# Patient Record
Sex: FEMALE | Race: WHITE | NOT HISPANIC OR LATINO | Employment: FULL TIME | URBAN - METROPOLITAN AREA
[De-identification: names, ages, dates, MRNs, and addresses within clinical notes are randomized per-mention and may not be internally consistent; named-entity substitution may affect disease eponyms.]

---

## 2024-05-19 ENCOUNTER — HOSPITAL ENCOUNTER (EMERGENCY)
Facility: CLINIC | Age: 24
Discharge: HOME | End: 2024-05-20
Attending: PHYSICIAN ASSISTANT | Admitting: PHYSICIAN ASSISTANT
Payer: COMMERCIAL

## 2024-05-19 DIAGNOSIS — R07.9 CHEST PAIN: ICD-10-CM

## 2024-05-19 DIAGNOSIS — J45.909 ASTHMA: ICD-10-CM

## 2024-05-19 DIAGNOSIS — R30.0 DYSURIA: ICD-10-CM

## 2024-05-19 DIAGNOSIS — R06.00 DYSPNEA: ICD-10-CM

## 2024-05-19 DIAGNOSIS — J04.0 LARYNGITIS: ICD-10-CM

## 2024-05-19 DIAGNOSIS — R05.9 COUGH: ICD-10-CM

## 2024-05-19 DIAGNOSIS — J02.9 PHARYNGITIS: ICD-10-CM

## 2024-05-19 LAB
ALBUMIN UR-MCNC: NEGATIVE MG/DL
ANION GAP SERPL CALCULATED.3IONS-SCNC: 9 MMOL/L (ref 7–15)
APPEARANCE UR: CLEAR
BASOPHILS # BLD AUTO: 0 10E3/UL (ref 0–0.2)
BASOPHILS NFR BLD AUTO: 0 %
BILIRUB UR QL STRIP: NEGATIVE
BUN SERPL-MCNC: 13.3 MG/DL (ref 6–20)
CALCIUM SERPL-MCNC: 9 MG/DL (ref 8.6–10)
CHLORIDE SERPL-SCNC: 103 MMOL/L (ref 98–107)
COLOR UR AUTO: ABNORMAL
CREAT SERPL-MCNC: 0.59 MG/DL (ref 0.51–0.95)
D DIMER PPP FEU-MCNC: 0.55 UG/ML FEU (ref 0–0.5)
DEPRECATED HCO3 PLAS-SCNC: 25 MMOL/L (ref 22–29)
EGFRCR SERPLBLD CKD-EPI 2021: >90 ML/MIN/1.73M2
EOSINOPHIL # BLD AUTO: 0.3 10E3/UL (ref 0–0.7)
EOSINOPHIL NFR BLD AUTO: 5 %
ERYTHROCYTE [DISTWIDTH] IN BLOOD BY AUTOMATED COUNT: 13.8 % (ref 10–15)
FLUAV RNA SPEC QL NAA+PROBE: NEGATIVE
FLUBV RNA RESP QL NAA+PROBE: NEGATIVE
GLUCOSE SERPL-MCNC: 90 MG/DL (ref 70–99)
GLUCOSE UR STRIP-MCNC: NEGATIVE MG/DL
GROUP A STREP BY PCR: NOT DETECTED
HCG UR QL: NEGATIVE
HCT VFR BLD AUTO: 40.7 % (ref 35–47)
HGB BLD-MCNC: 12.5 G/DL (ref 11.7–15.7)
HGB UR QL STRIP: NEGATIVE
HOLD SPECIMEN: NORMAL
IMM GRANULOCYTES # BLD: 0 10E3/UL
IMM GRANULOCYTES NFR BLD: 0 %
KETONES UR STRIP-MCNC: ABNORMAL MG/DL
LEUKOCYTE ESTERASE UR QL STRIP: NEGATIVE
LYMPHOCYTES # BLD AUTO: 1.9 10E3/UL (ref 0.8–5.3)
LYMPHOCYTES NFR BLD AUTO: 33 %
MCH RBC QN AUTO: 24.4 PG (ref 26.5–33)
MCHC RBC AUTO-ENTMCNC: 30.7 G/DL (ref 31.5–36.5)
MCV RBC AUTO: 79 FL (ref 78–100)
MONOCYTES # BLD AUTO: 0.5 10E3/UL (ref 0–1.3)
MONOCYTES NFR BLD AUTO: 8 %
MUCOUS THREADS #/AREA URNS LPF: PRESENT /LPF
NEUTROPHILS # BLD AUTO: 3.1 10E3/UL (ref 1.6–8.3)
NEUTROPHILS NFR BLD AUTO: 54 %
NITRATE UR QL: NEGATIVE
NRBC # BLD AUTO: 0 10E3/UL
NRBC BLD AUTO-RTO: 0 /100
PH UR STRIP: 5 [PH] (ref 5–7)
PLATELET # BLD AUTO: 241 10E3/UL (ref 150–450)
POTASSIUM SERPL-SCNC: 3.9 MMOL/L (ref 3.4–5.3)
RBC # BLD AUTO: 5.13 10E6/UL (ref 3.8–5.2)
RBC URINE: 2 /HPF
RSV RNA SPEC NAA+PROBE: NEGATIVE
SARS-COV-2 RNA RESP QL NAA+PROBE: NEGATIVE
SODIUM SERPL-SCNC: 137 MMOL/L (ref 135–145)
SP GR UR STRIP: 1.03 (ref 1–1.03)
SQUAMOUS EPITHELIAL: 4 /HPF
TROPONIN T SERPL HS-MCNC: <6 NG/L
UROBILINOGEN UR STRIP-MCNC: NORMAL MG/DL
WBC # BLD AUTO: 5.8 10E3/UL (ref 4–11)
WBC URINE: 1 /HPF

## 2024-05-19 PROCEDURE — 81025 URINE PREGNANCY TEST: CPT | Performed by: PHYSICIAN ASSISTANT

## 2024-05-19 PROCEDURE — 36415 COLL VENOUS BLD VENIPUNCTURE: CPT | Performed by: PHYSICIAN ASSISTANT

## 2024-05-19 PROCEDURE — 87637 SARSCOV2&INF A&B&RSV AMP PRB: CPT | Performed by: PHYSICIAN ASSISTANT

## 2024-05-19 PROCEDURE — 99285 EMERGENCY DEPT VISIT HI MDM: CPT | Mod: 25

## 2024-05-19 PROCEDURE — 87651 STREP A DNA AMP PROBE: CPT | Performed by: PHYSICIAN ASSISTANT

## 2024-05-19 PROCEDURE — 80048 BASIC METABOLIC PNL TOTAL CA: CPT | Performed by: PHYSICIAN ASSISTANT

## 2024-05-19 PROCEDURE — 85004 AUTOMATED DIFF WBC COUNT: CPT | Performed by: PHYSICIAN ASSISTANT

## 2024-05-19 PROCEDURE — 84484 ASSAY OF TROPONIN QUANT: CPT | Performed by: PHYSICIAN ASSISTANT

## 2024-05-19 PROCEDURE — 85379 FIBRIN DEGRADATION QUANT: CPT | Performed by: PHYSICIAN ASSISTANT

## 2024-05-19 PROCEDURE — 81001 URINALYSIS AUTO W/SCOPE: CPT | Performed by: PHYSICIAN ASSISTANT

## 2024-05-19 PROCEDURE — 93005 ELECTROCARDIOGRAM TRACING: CPT

## 2024-05-19 RX ORDER — PREDNISONE 20 MG/1
TABLET ORAL
Qty: 10 TABLET | Refills: 0 | Status: SHIPPED | OUTPATIENT
Start: 2024-05-19

## 2024-05-19 ASSESSMENT — COLUMBIA-SUICIDE SEVERITY RATING SCALE - C-SSRS
6. HAVE YOU EVER DONE ANYTHING, STARTED TO DO ANYTHING, OR PREPARED TO DO ANYTHING TO END YOUR LIFE?: NO
1. IN THE PAST MONTH, HAVE YOU WISHED YOU WERE DEAD OR WISHED YOU COULD GO TO SLEEP AND NOT WAKE UP?: NO
2. HAVE YOU ACTUALLY HAD ANY THOUGHTS OF KILLING YOURSELF IN THE PAST MONTH?: NO

## 2024-05-19 ASSESSMENT — ACTIVITIES OF DAILY LIVING (ADL)
ADLS_ACUITY_SCORE: 35
ADLS_ACUITY_SCORE: 33

## 2024-05-19 NOTE — Clinical Note
Dora Guillaume was seen and treated in our emergency department on 5/19/2024.  She may return to work on 05/21/2024.       If you have any questions or concerns, please don't hesitate to call.      Dmitry Brandt PA-C

## 2024-05-20 ENCOUNTER — APPOINTMENT (OUTPATIENT)
Dept: CT IMAGING | Facility: CLINIC | Age: 24
End: 2024-05-20
Attending: PHYSICIAN ASSISTANT
Payer: COMMERCIAL

## 2024-05-20 VITALS
OXYGEN SATURATION: 99 % | WEIGHT: 135 LBS | HEIGHT: 68 IN | DIASTOLIC BLOOD PRESSURE: 88 MMHG | HEART RATE: 88 BPM | BODY MASS INDEX: 20.46 KG/M2 | SYSTOLIC BLOOD PRESSURE: 139 MMHG | RESPIRATION RATE: 20 BRPM | TEMPERATURE: 99.3 F

## 2024-05-20 LAB
ATRIAL RATE - MUSE: 75 BPM
DIASTOLIC BLOOD PRESSURE - MUSE: NORMAL MMHG
INTERPRETATION ECG - MUSE: NORMAL
P AXIS - MUSE: 71 DEGREES
PR INTERVAL - MUSE: 140 MS
QRS DURATION - MUSE: 82 MS
QT - MUSE: 372 MS
QTC - MUSE: 415 MS
R AXIS - MUSE: 83 DEGREES
SYSTOLIC BLOOD PRESSURE - MUSE: NORMAL MMHG
T AXIS - MUSE: 47 DEGREES
VENTRICULAR RATE- MUSE: 75 BPM

## 2024-05-20 PROCEDURE — 250N000011 HC RX IP 250 OP 636: Performed by: PHYSICIAN ASSISTANT

## 2024-05-20 PROCEDURE — 71275 CT ANGIOGRAPHY CHEST: CPT

## 2024-05-20 RX ORDER — IOPAMIDOL 755 MG/ML
53 INJECTION, SOLUTION INTRAVASCULAR ONCE
Status: COMPLETED | OUTPATIENT
Start: 2024-05-20 | End: 2024-05-20

## 2024-05-20 RX ORDER — BENZONATATE 100 MG/1
100 CAPSULE ORAL 3 TIMES DAILY PRN
Qty: 12 CAPSULE | Refills: 0 | Status: SHIPPED | OUTPATIENT
Start: 2024-05-20

## 2024-05-20 RX ADMIN — IOPAMIDOL 53 ML: 755 INJECTION, SOLUTION INTRAVENOUS at 00:14

## 2024-05-20 NOTE — ED PROVIDER NOTES
"  Emergency Department Note      History of Present Illness     Chief Complaint  multiple complaints    HPI  Dora Guillaume is a 23 year old female with history of asthma who presents to the ED accompanied with multiple complaints. Patient reports symptoms started last Monday (7 days prior) that began with pain in the roof of her mouth and sinuses. She does have allergies and states that the last time she had a cough and shortness of breath it was due to allergies at the time. Reports ongoing intermittent chest pain following heavy coughing but does note she occasionally experiences this at rest. She has discussed this previously with no findings and she last had chest pain just prior to arrival at the ED. States she has a productive cough with bloody mucous, but the hemoptysis resolved yesterday. Endorses pharyngitis, onset of laryngitis Friday (2 days prior), and dysuria that began earlier tonight. She notes urinary frequency at baseline. No back pain, flank pain, vaginal discharge, fever, history of PE/DVT, birth control, or lower extremity edema. Patient is not on blood thinners. Denies concern for pregnancy. Of note, patient reports she was sick with a \"cold\" for an entire month just prior to onset of current symptoms. She states she seems to be sick for prolonged periods at a time. Reports history of COVID-19 2 years then bronchitis and strep simultaneously.       Independent Historian  None as detailed above.    Review of External Notes  None  Past Medical History   Medical History and Problem List  Asthma   COVID-19     Medications  benzonatate (TESSALON) 100 MG capsule  predniSONE (DELTASONE) 20 MG tablet        Surgical History   No past surgical history on file.  Physical Exam   Patient Vitals for the past 24 hrs:   BP Temp Temp src Pulse Resp SpO2 Height Weight   05/19/24 2124 (!) 141/88 99.3  F (37.4  C) Oral 96 20 100 % 1.727 m (5' 8\") 61.2 kg (135 lb)     Physical Exam    Constitutional: Pleasant. " Cooperative.   Eyes: Pupils equally round and reactive  HENT: Head is normal in appearance. Oropharynx is normal with moist mucus membranes.  Cardiovascular: Regular rate and rhythm and without murmurs.  Respiratory: Normal respiratory effort, lungs are clear bilaterally.  Musculoskeletal: No asymmetry of the lower extremities  Skin: Normal, without rash.  Neurologic: Cranial nerves grossly intact, normal cognition, no focal deficits. Alert and oriented x 3.   Psychiatric: Normal affect.  Nursing notes and vital signs reviewed.  Diagnostics   Lab Results   Labs Ordered and Resulted from Time of ED Arrival to Time of ED Departure   ROUTINE UA WITH MICROSCOPIC REFLEX TO CULTURE - Abnormal       Result Value    Color Urine Light Yellow      Appearance Urine Clear      Glucose Urine Negative      Bilirubin Urine Negative      Ketones Urine Trace (*)     Specific Gravity Urine 1.027      Blood Urine Negative      pH Urine 5.0      Protein Albumin Urine Negative      Urobilinogen Urine Normal      Nitrite Urine Negative      Leukocyte Esterase Urine Negative      Mucus Urine Present (*)     RBC Urine 2      WBC Urine 1      Squamous Epithelials Urine 4 (*)    D DIMER QUANTITATIVE - Abnormal    D-Dimer Quantitative 0.55 (*)    CBC WITH PLATELETS AND DIFFERENTIAL - Abnormal    WBC Count 5.8      RBC Count 5.13      Hemoglobin 12.5      Hematocrit 40.7      MCV 79      MCH 24.4 (*)     MCHC 30.7 (*)     RDW 13.8      Platelet Count 241      % Neutrophils 54      % Lymphocytes 33      % Monocytes 8      % Eosinophils 5      % Basophils 0      % Immature Granulocytes 0      NRBCs per 100 WBC 0      Absolute Neutrophils 3.1      Absolute Lymphocytes 1.9      Absolute Monocytes 0.5      Absolute Eosinophils 0.3      Absolute Basophils 0.0      Absolute Immature Granulocytes 0.0      Absolute NRBCs 0.0     INFLUENZA A/B, RSV, & SARS-COV2 PCR - Normal    Influenza A PCR Negative      Influenza B PCR Negative      RSV PCR Negative       SARS CoV2 PCR Negative     BASIC METABOLIC PANEL - Normal    Sodium 137      Potassium 3.9      Chloride 103      Carbon Dioxide (CO2) 25      Anion Gap 9      Urea Nitrogen 13.3      Creatinine 0.59      GFR Estimate >90      Calcium 9.0      Glucose 90     TROPONIN T, HIGH SENSITIVITY - Normal    Troponin T, High Sensitivity <6     HCG QUALITATIVE URINE - Normal    hCG Urine Qualitative Negative     GROUP A STREPTOCOCCUS PCR THROAT SWAB - Normal    Group A strep by PCR Not Detected         Imaging  CT Chest Pulmonary Embolism w Contrast   Final Result   IMPRESSION:      1.  No acute findings in the chest, including pulmonary embolism or pneumonia.      2.  Mild pectus excavatum deformity.          EKG     ECG results from 05/19/24   EKG 12-lead, tracing only     Value    Systolic Blood Pressure     Diastolic Blood Pressure     Ventricular Rate 75    Atrial Rate 75    AK Interval 140    QRS Duration 82        QTc 415    P Axis 71    R AXIS 83    T Axis 47    Interpretation ECG      Sinus rhythm  Normal ECG        Independent Interpretation  None  ED Course    Medications Administered  Medications   iopamidol (ISOVUE-370) solution 53 mL (53 mLs Intravenous $Given 5/20/24 0014)   sodium chloride (PF) 0.9% PF flush 74 mL (74 mLs Intravenous $Given 5/20/24 0016)       Procedures  Procedures     Discussion of Management  None    Social Determinants of Health adding to complexity of care  None    ED Course  ED Course as of 05/20/24 0041   Sun May 19, 2024   2222 I obtained history and examined the patient as noted above.      Medical Decision Making / Diagnosis   CMS Diagnoses: None    MIPS  MIPS: CT for PE was ordered because patient high risk for PE with hemoptysis and elevated d dimer.      SILVIO Guillaume is a 23 year old female who presents to the ED for multiple symptoms.  Patient describes upper respiratory symptoms including pharyngitis, laryngitis, as well as cough.  With respect to these  symptoms, differential included strep pharyngitis, COVID, influenza, PTA, RPA, epiglottitis, pneumonia, amongst others.  Strep, COVID, influenza swabs returned negative.  CT of chest without suggestion for pneumonia.  Oropharyngeal exam is reassuring otherwise.  Suspect viral URI.  Patient also describes dysuria.  Differential included UTI, did also discuss possibility for vaginal infection such as BV causing dysuria versus just irritation to the urethra.  UA without suggestion for UTI.  Patient declines wet prep to evaluate for vaginal infection such as BV, yeast.  Discussed with patient irritation is a possibility.  With persistence, she should seek out primary care for further evaluation.  Patient additionally describes chest pain and dyspnea.  Differential is broad including ACS, dissection, PE, pneumothorax, GERD, MSK, pneumonia, pericarditis, amongst others.  Troponin is negative, making ACS less likely in patient who is very low risk.  ECG reassuring as above.  D-dimer was elevated, however follow-up CT PE returns unremarkable without evidence for acute intrathoracic abnormality.  Discussed with patient unclear etiology of her symptoms. Advised Tylenol and ibuprofen for sore throat. Advised she stay hydrated. Will provide Rx for Tessalon to decrease cough frequency. Will provide Rx for prednisone given patient's asthma status and likely mild asthma exacerbation in setting of concurrent URI. HEART score of 0. Follow up with PCP with persistent symptoms. Discussed reasons to return. All questions answered. Patient discharged to home in stable condition.    Disposition  The patient was discharged.     ICD-10 Codes:    ICD-10-CM    1. Pharyngitis  J02.9       2. Cough  R05.9       3. Chest pain  R07.9       4. Dyspnea  R06.00       5. Dysuria  R30.0       6. Laryngitis  J04.0       7. Asthma  J45.909            Discharge Medications  New Prescriptions    BENZONATATE (TESSALON) 100 MG CAPSULE    Take 1 capsule  (100 mg) by mouth 3 times daily as needed for cough    PREDNISONE (DELTASONE) 20 MG TABLET    Take two tablets (= 40mg) each day for 5 (five) days         Scribe Disclosure:  I, Kayley Blount, am serving as a scribe at 10:37 PM on 5/19/2024 to document services personally performed by Dmitry Brandt PA-C based on my observations and the provider's statements to me.     This record was created at least in part using electronic voice recognition software, so please excuse any typographical errors.       Dmitry Brandt PA-C  05/20/24 0048

## 2024-05-20 NOTE — DISCHARGE INSTRUCTIONS
The cause of your symptoms is unclear at this time. There is no evidence for life-threatening cause based upon the labs, imaging, and ECG obtained here today. Use Tylenol and ibuprofen for sore throat. Use Tessalon to help decrease cough frequency. Stay hydrated. Given your history of asthma and cough, we will trial a course of steroids as well.

## 2024-05-20 NOTE — ED TRIAGE NOTES
Pt arrives with multiple complains.  Sore throat for a few days  Chest pain intermittent  History of asthma, using inhaler with no improvement  Burning and pain with urination, declines back or kidney pain  Lost voice on Friday  Intense coughing  Worried about when she gets sick it seems to be for long periods of time.      Triage Assessment (Adult)       Row Name 05/19/24 [id number]          Triage Assessment    Airway WDL WDL        Respiratory WDL    Respiratory WDL X        Skin Circulation/Temperature WDL    Skin Circulation/Temperature WDL WDL        Cardiac WDL    Cardiac WDL X        Peripheral/Neurovascular WDL    Peripheral Neurovascular WDL WDL        Cognitive/Neuro/Behavioral WDL    Cognitive/Neuro/Behavioral WDL WDL